# Patient Record
(demographics unavailable — no encounter records)

---

## 2025-03-11 NOTE — PROCEDURE
[de-identified] : PROCEDURE NOTES   PROCEDURE: Nasal endoscopy SURGEON: Dr. Pinedo INDICATIONS: Assess for chronic sinusitis. Verbal consent obtained. ANESTHESIA: The patient was placed in a sitting position.  Following application of the topical anesthetic and decongestant, exam was performed with a zero degree endoscope.  The scope was passed along the right nasal floor to the nasopharynx.  It was then passed into the region of the middle meatus, middle turbinate, and sphenoethmoid region.  An identical procedure was performed on the left side.  The following findings were noted:   The nasal mucosa was healthy appearing and the septum was roughly midline. The middle meatus and sphenoethmoid recesses were clear bilaterally. The Superior meatus was without lesion or infection.  The Inferior, Middle and Superior Turbinates were not enlarged and healthy in appearance.  There was not pus, polyps or mass.  The nasopharynx was normal.   Tolerated the procedure well.    Right maxillary crest spur deviated septum to the right.  Bilateral inferior turban hypertrophy.

## 2025-03-11 NOTE — ASSESSMENT
[FreeTextEntry1] : Both structural and mucosal nasal compromise. Minimal response to Kemper maneuver. Will give a trial of Flonase and Astelin. Follow-up in 2 months. Surgical intervention pending her clinical course.

## 2025-03-11 NOTE — PROCEDURE
[de-identified] : PROCEDURE NOTES   PROCEDURE: Nasal endoscopy SURGEON: Dr. Pinedo INDICATIONS: Assess for chronic sinusitis. Verbal consent obtained. ANESTHESIA: The patient was placed in a sitting position.  Following application of the topical anesthetic and decongestant, exam was performed with a zero degree endoscope.  The scope was passed along the right nasal floor to the nasopharynx.  It was then passed into the region of the middle meatus, middle turbinate, and sphenoethmoid region.  An identical procedure was performed on the left side.  The following findings were noted:   The nasal mucosa was healthy appearing and the septum was roughly midline. The middle meatus and sphenoethmoid recesses were clear bilaterally. The Superior meatus was without lesion or infection.  The Inferior, Middle and Superior Turbinates were not enlarged and healthy in appearance.  There was not pus, polyps or mass.  The nasopharynx was normal.   Tolerated the procedure well.    Right maxillary crest spur deviated septum to the right.  Bilateral inferior turban hypertrophy.

## 2025-03-11 NOTE — HISTORY OF PRESENT ILLNESS
[de-identified] : Initial visit. Chief complaint is "nose problems, cannot breathe well". She reports that this problem has been present for most of her life. She grew up in [place] and moved from a [facility] to [facility] a year ago. She feels that her problem is bilateral but worse on the right than the left. She has not taken any treatment. She reports that when she was about 17 she had some nasal trauma. Reports a normal sense of smell and taste. Notes that generally in a recumbent position it is worse and in fact when she wakes up in the morning there is a routine for her to breathe better.

## 2025-03-11 NOTE — ASSESSMENT
[FreeTextEntry1] : Both structural and mucosal nasal compromise. Minimal response to Dukes maneuver. Will give a trial of Flonase and Astelin. Follow-up in 2 months. Surgical intervention pending her clinical course.

## 2025-03-11 NOTE — HISTORY OF PRESENT ILLNESS
[de-identified] : Initial visit. Chief complaint is "nose problems, cannot breathe well". She reports that this problem has been present for most of her life. She grew up in [place] and moved from a [facility] to [facility] a year ago. She feels that her problem is bilateral but worse on the right than the left. She has not taken any treatment. She reports that when she was about 17 she had some nasal trauma. Reports a normal sense of smell and taste. Notes that generally in a recumbent position it is worse and in fact when she wakes up in the morning there is a routine for her to breathe better.

## 2025-05-09 NOTE — ASSESSMENT
[FreeTextEntry1] : She has both structural mucosal nasal obstruction. I have asked her to continue with topical nasal sprays but also use nasal moisturizer. We talked about the utility of septoplasty and inferior turbinate submucosal resection. We talked about variable outcomes. We also talked about the need to potentially continue to use topical nasal spray.  She then inquired if she wanted to consider something cosmetic could it be done at the same time. I told her absolutely and I would not recommend septoplasty if she had rhinoplasty in mind. I referred her 2 colleagues that I have a lot of respect for for opinions.

## 2025-05-09 NOTE — HISTORY OF PRESENT ILLNESS
[de-identified] : Comes in follow-up today.  Use nasal spray as recommended. She does note that she had some improvement. However she does continue to have persistent nasal congestion that switches sides. She also notices some bloody crusting.